# Patient Record
Sex: FEMALE
[De-identification: names, ages, dates, MRNs, and addresses within clinical notes are randomized per-mention and may not be internally consistent; named-entity substitution may affect disease eponyms.]

---

## 2018-08-11 ENCOUNTER — HOSPITAL ENCOUNTER (EMERGENCY)
Dept: HOSPITAL 68 - ERH | Age: 18
End: 2018-08-11
Payer: SELF-PAY

## 2018-08-11 VITALS — SYSTOLIC BLOOD PRESSURE: 114 MMHG | DIASTOLIC BLOOD PRESSURE: 65 MMHG

## 2018-08-11 VITALS — BODY MASS INDEX: 25.76 KG/M2 | HEIGHT: 62 IN | WEIGHT: 140 LBS

## 2018-08-11 DIAGNOSIS — M25.511: Primary | ICD-10-CM

## 2018-08-11 DIAGNOSIS — R20.0: ICD-10-CM

## 2018-08-11 NOTE — RADIOLOGY REPORT
EXAMINATION:
XR SHOULDER, RIGHT
 
CLINICAL INFORMATION:
RIGHT SHOULDER PAIN, TINGLING DOWN ARM
 
COMPARISON:
None
 
TECHNIQUE:
AP external rotation, Grashey, scapular Y, and axillary views of the right
shoulder.
 
FINDINGS:
The humeral head appears normally located within the glenoid cavity. There is
a tiny cortical step-off along the lateral aspect at the level of the
epiphysis. This is best appreciated on the Grashey view. Otherwise, no
evidence for acute fracture. There is prominence of the acromioclavicular
joint which measures 5 mm. On the external rotation view of the clavicle
appears slightly superiorly positioned compared to the acromium. The
coracoclavicular interval is maintained. The right lung apex is clear.
 
IMPRESSION:
 
1. Question possible nondisplaced fracture involving the growth plate of the
proximal humerus laterally. Recommend correlation with symptoms.
2. Question injury to the acromioclavicular joint. This could be confirmed
and further assessed with radiographs of the contralateral side and
weightbearing examination.

## 2018-08-11 NOTE — ED UPPER/LOWER EXTREMITY COMPL
History of Present Illness
 
General
Chief Complaint: Upper Extremity Problem
Stated Complaint: R ARM NUMBNESS TINLGING PAIN RADIATING TO BACK??
Source: patient
Exam Limitations: no limitations
 
Vital Signs & Intake/Output
Vital Signs & Intake/Output
 Vital Signs
 
 
Date Time Temp Pulse Resp B/P B/P Pulse O2 O2 Flow FiO2
 
     Mean Ox Delivery Rate 
 
 1615       Room Air  
 
 1352 96.5 91 20 114/65  98 Room Air  
 
 
 
Allergies
Coded Allergies:
latex (UNKNOWN 18)
Uncoded Allergies:
SEAFOOD (UNKNOWN 18)
 
Reconcile Medications
Cyclobenzaprine HCl 10 MG TABLET   1 TAB PO TID SPASMS
Methylprednisolone. (Medrol) 4 MG TAB.DS.PK   1 DP PO AD tigling right arm
     6 on day 1 then reduce by one tablet daily until gone
 
Triage Note:
PT TO ED C/O RIGHT ARM NUMBNESS THAT RADIATES TO
 BACK AND DOWN TO HAND X 2 WEEKS.
Triage Nurses Notes Reviewed? yes
Onset: Abrupt
Duration: week(s): (2), constant
Timing: recent history
Severity: moderate, severe
Pain/Injury Location:
Right: Shoulder. 
No Modifying Factors: none
Pregnant: No
Patient currently breastfeeds: No
HPI:
18-year-old female comes into the emergency room for further evaluation of right
shoulder pain.  Patient reports associated numbness and tingling that radiates 
down her right shoulder into her fingers.  She has associated pain.  Worse with 
certain movements of her shoulder.  Patient reports it started a couple weeks 
ago.  She reports she woke up and the symptoms got progressively worse today.  
She came in for further evaluation.
(Jc Matson)
 
Past History
 
Travel History
Traveled to [place] past 21 day No
 
Medical History
Any Pertinent Medical History? none
 
Surgical History
Surgical History: non-contributory
 
Psychosocial History
What is your primary language English
Tobacco Use: Never used
ETOH Use: denies use
Illicit Drug Use: denies illicit drug use
 
Family History
Hx Contributory? No
(Jc Matson)
 
Review of Systems
 
Review of Systems
Constitutional:
Reports: no symptoms. 
EENTM:
Reports: no symptoms. 
Respiratory:
Reports: no symptoms. 
Cardiovascular:
Reports: no symptoms. 
Gastrointestinal/Abdominal:
Reports: no symptoms. 
Genitourinary:
Reports: no symptoms. 
Musculoskeletal:
Reports: see HPI. 
Skin:
Reports: no symptoms. 
Neurological/Psychological:
Reports: no symptoms. 
Hematologic/Endocrine:
Reports: no symptoms. 
Immunological:
Reports: no symptoms. 
All Other Systems: Reviewed and Negative
(Jc Matson)
 
Physical Exam
 
Physical Exam
General Appearance: well developed/nourished, no apparent distress, alert, awake
Head: atraumatic
Eyes:
Bilateral: normal appearance. 
Ears, Nose, Throat: normal ENT inspection, hearing grossly normal
Neck: normal inspection
Cardiovascular/Respiratory: no respiratory distress
Back: normal inspection
Shoulder Right: normal range of motion, soft tissue tenderness, 5 out of 5  
strength, radial pulse intact, gross sensation intact,
Neurologic/Tendon: normal sensation, normal motor functions, normal tendon 
functions, responds to pain, no evidence tendon injury, no pulse deficit
Skin: intact, normal color, warm/dry
 
Diagram
Shoulders Front/Back
 
  1) 
(Jc Matson)
 
Progress
Differential Diagnosis: contusion, dislocation, fracture, septic arthritis, 
sprain, tendon injury, muscle strain
Plan of Care:
 Orders
 
 
Procedure Date/time Status
 
Durable Medical Equipment  1549 Active
 
URINE PREGNANCY  1355 Complete
 
 
 Laboratory Tests
 
 
 
18 1410:
Urine Pregnancy Test NEGATIVE
 
Diagnostic Imaging:
Viewed by Me: Radiology Read.  Discussed w/RAD: Radiology Read. 
Radiology Impression: PATIENT: APOLONIA PEDROZA  MEDICAL RECORD NO: [id number] 
PRESENT AGE: 18  PATIENT ACCOUNT NO: [id number] : 98  LOCATION: [facility] 
ORDERING PHYSICIAN: Jc LANZA     SERVICE DATE: 18- EXAM TYPE
: RAD - XRY-SHOULDER COMPLETE-RIGHT EXAMINATION: XR SHOULDER, RIGHT CLINICAL 
INFORMATION: RIGHT SHOULDER PAIN, TINGLING DOWN ARM COMPARISON: None TECHNIQUE: 
AP external rotation, Grashey, scapular Y, and axillary views of the right 
shoulder. FINDINGS: The humeral head appears normally located within the glenoid
cavity. There is a tiny cortical step-off along the lateral aspect at the level 
of the epiphysis. This is best appreciated on the Grashey view. Otherwise, no 
evidence for acute fracture. There is prominence of the acromioclavicular joint 
which measures 5 mm. On the external rotation view of the clavicle appears 
slightly superiorly positioned compared to the acromium. The coracoclavicular 
interval is maintained. The right lung apex is clear. IMPRESSION: 1. Question 
possible nondisplaced fracture involving the growth plate of the proximal 
humerus laterally. Recommend correlation with symptoms. 2. Question injury to 
the acromioclavicular joint. This could be confirmed and further assessed with 
radiographs of the contralateral side and weightbearing examination. DICTATED BY
: Charlene Edouard MD  DATE/TIME DICTATED:18 :RAD.BABIN  
DATE/TIME TRANSCRIBED:18 CONFIDENTIAL, DO NOT COPY WITHOUT 
APPROPRIATE AUTHORIZATION.  <Electronically signed in Other Vendor System>      
                                                                                
SIGNED BY: Charlene Edouard MD 18
(Jc Matson)
 
Departure
 
Departure
Disposition: HOME OR SELF CARE
Condition: Stable
Clinical Impression
Primary Impression: Right shoulder pain
Secondary Impressions: Numbness and tingling in right hand
Referrals:
Jackson LE,Herrera
 
Additional Instructions:
Follow-up with orthopedic doctor.  Take Flexeril and Medrol Dosepak as 
prescribed.  Return if any concerns worsening symptoms.
 
Please go over all results of today's visit with your primary care doctor.  
Contact your primary care doctor to let them know you were here in the emergency
room.  There may be nonspecific findings which may not be related to your visit 
today here in the emergency room but may require further evaluation and chronic 
monitoring by your primary care doctor.  If you had a laceration today the 
chance of foreign body always remains. You should follow-up with your primary 
care doctor for recheck in 3-5 days for a wound check.  If you had an x-ray done
there is a chance that a fracture could have been missed on initial read and you
should follow-up with your primary care doctor for repeat x-rays if symptoms 
persist.  If your blood pressure was elevated here in the emergency room please 
have rechecked by [facility] primary care doctor within the next 48.  If you were 
prescribed a narcotic here in the emergency room or any type of controlled 
substances you're not allowed to drive while taking this medication or operate 
any type of heavy machinery.  Narcotics can make you feel lightheaded dizziness 
nausea and can cause constipation.  You may need to  a stool softener.  
Thank you for choosing [facility] emergency room.  Please return to the 
emergency room immediately if you have any other concerns worsening of symptoms.
 
Departure Forms:
Customer Survey
General Discharge Information
Prescriptions:
Current Visit Scripts
Methylprednisolone. (Medrol) 1 DP PO AD  
     #1 DP 
     6 on day 1 then reduce by one tablet daily until gone
 
Cyclobenzaprine HCl 1 TAB PO TID  
     #30 TAB 
 
 
Comments
2018 4:39:14 PM
 
Patient clinically looks well.  Patient is in no apparent distress.  She had no 
falls or trauma.  She reports that she had broken her right shoulder when she 
was a kid.  No clinical signs for approximately humerus fracture.  Patient was 
placed in a shoulder immobilizer and referred to orthopedic doctor.  Ibuprofen 
Medrol Dosepak.  Return if any other concerns.  No motor weakness.  Cervical 
radiculopathy versus muscle spasming.
(Jc Matson)
 
PA/NP Co-Sign Statement
Statement:
ED Attending supervision documentation-
 
[] I saw and evaluated the patient. I have also reviewed all the pertinent lab 
results and diagnostic results. I agree with the findings and the plan of care 
as documented in the PA's/NP's documentation. 
 
[X] I have reviewed the ED Record and agree with the PA's/NP's documentation.
 
[] Additions or exceptions (if any) to the PAs/NP's note and plan are 
summarized below:
[]
 
(Solo LE,Ghassan BREWER)
 
Procedures
 
Splinting
Location: Right shoulder
Pre-Made Type: shoulder mobilizer
Splint Applied By: splint applied by me
Pre-Proc Neuro Vasc Exam: normal
Post-Proc Neuro Vasc Exam: normal
(Jc Matson)

## 2018-09-29 ENCOUNTER — HOSPITAL ENCOUNTER (EMERGENCY)
Dept: HOSPITAL 68 - ERH | Age: 18
LOS: 1 days | End: 2018-09-30
Payer: COMMERCIAL

## 2018-09-29 VITALS — HEIGHT: 62 IN | BODY MASS INDEX: 27.23 KG/M2 | WEIGHT: 148 LBS

## 2018-09-29 DIAGNOSIS — M54.5: Primary | ICD-10-CM

## 2018-09-30 VITALS — SYSTOLIC BLOOD PRESSURE: 130 MMHG | DIASTOLIC BLOOD PRESSURE: 60 MMHG

## 2018-09-30 NOTE — ED NECK/BACK PAIN COMPLAINT
History of Present Illness
 
General
Chief Complaint: Low Back Pain/Injury
Stated Complaint: LOW BACK PAIN
Source: patient
Exam Limitations: no limitations
 
Vital Signs & Intake/Output
Vital Signs & Intake/Output
 Vital Signs
 
 
Date Time Temp Pulse Resp B/P B/P Pulse O2 O2 Flow FiO2
 
     Mean Ox Delivery Rate 
 
09/30 0130 98.6 85 18 130/60  99 Room Air  
 
09/30 0004 99.7 89 20 104/62  96 Room Air  
 
 
 ED Intake and Output
 
 
 09/30 0000 09/29 1200
 
Intake Total  
 
Output Total  
 
Balance  
 
   
 
Patient 148 lb 
 
Weight  
 
Weight Standing Scale 
 
Measurement  
 
Method  
 
 
 
Allergies
Coded Allergies:
latex (UNKNOWN 08/11/18)
Uncoded Allergies:
SEAFOOD (UNKNOWN 08/11/18)
 
Reconcile Medications
Cyclobenzaprine HCl 10 MG TABLET   1 TAB PO TID SPASMS
Methylprednisolone. (Medrol) 4 MG TAB.DS.PK   1 DP PO AD tigling right arm
     6 on day 1 then reduce by one tablet daily until gone
 
Triage Note:
PT REPORTS FEELING WEAK AND TIRED FOR "LIKE A WEEK
 OR TWO". PT REPORTS BACK PAIN, "I TOOK SOME PILLS
 AND IT DONT GO AWAY".
Triage Nurses Notes Reviewed? yes
Pregnant: No
Patient currently breastfeeds: No
HPI:
18-year-old female presents with atraumatic middle low back pain over the past 
week after bending over.  She denies any radiation of the pain, bowel or bladder
incontinence, fever, leg weakness, flank pain, nausea, vomiting, diarrhea, rash,
history of trauma, or difficulty ambulating.  Patient reports a previous history
of scoliosis.  She denies any urinary tract symptoms.
 
Past History
 
Travel History
Traveled to [place] past 21 day No
 
Medical History
Any Pertinent Medical History? see below for history
Neurological: NONE
EENT: NONE
Cardiovascular: NONE
Respiratory: NONE
Gastrointestinal: NONE
Hepatic: NONE
Renal: NONE
Musculoskeletal: SCOLIOSIS
Psychiatric: NONE
Endocrine: NONE
Blood Disorders: NONE
Cancer(s): NONE
GYN/Reproductive: NONE
 
Surgical History
Surgical History: non-contributory
 
Psychosocial History
What is your primary language English
Tobacco Use: Never used
ETOH Use: denies use
Illicit Drug Use: marijuana
 
Family History
Hx Contributory? No
 
Review of Systems
 
Review of Systems
Constitutional:
Reports: see HPI.  Denies: no symptoms, chills, diaphoresis, fever, malaise, 
weakness, unexplained weight loss. 
 
Physical Exam
 
Physical Exam
General Appearance: well developed/nourished, no apparent distress
Head: atraumatic, normal appearance
Eyes:
Bilateral: normal appearance. 
Neck: normal inspection, supple, full range of motion
Respiratory: normal breath sounds, chest non-tender
Cardiovascular: regular rate/rhythm
Gastrointestinal: normal bowel sounds, soft, non-tender
Back: Tenderness over the middle back at the area of L4. SLR negative. EHL 
function normal. DTR's normal over patella and achilles. Limited flexion 
secondary to pain.
 
Core Measures
CVA/TIA Diagnosis: No
 
Progress
Differential Diagnosis: herniated disc, myofascial strain, sciatica, T/L spine 
injury
Plan of Care:
 Orders
 
 
Procedure Date/time Status
 
URINE PREGNANCY 09/29 2355 Complete
 
URINALYSIS 09/29 2355 Complete
 
 
 Laboratory Tests
 
 
 
09/29/18 2357:
Urine Color YEL, Urine Clarity HAZY  H, Urine pH 6.0, Ur Specific Gravity >= 
1.030, Urine Protein NEG, Urine Ketones NEG, Urine Nitrite NEG, Urine Bilirubin 
NEG, Urine Urobilinogen 0.2, Ur Leukocyte Esterase MOD  H, Ur Microscopic 
SEDIMENT EXAMINED, Urine RBC 1-3, Urine WBC 3-5  H, Ur Epithelial Cells MANY  H,
Urine Bacteria MANY  H, Urine Hemoglobin TRACE-INTACT, Urine Glucose NEG, Urine 
Pregnancy Test NEGATIVE
 
Comments:
Patient with atraumatic back pain with tenderness in the area of the annulus 
over L4-5.  Patient without signs of acute myelopathy or radicular symptoms.  
Plan is conservative treatment with nonsteroidal anti-inflammatory drugs and 
Tylenol and referral to Orth O on-call for repeated assessments.
 
Departure
 
Departure
Time of Disposition: 0145
Disposition: HOME OR SELF CARE
Condition: Stable
Clinical Impression
Primary Impression: Low back pain
Qualifiers:  Chronicity: unspecified Back pain laterality: midline Sciatica 
presence: without sciatica Qualified Code: M54.5 - Low back pain
Referrals:
Patient Has No Primary Care Dr (PCP/Family)
 
Additional Instructions:
Please take extra strength Tylenol 1000 mg every 4 hours as needed for pain.  
Please follow-up with the orthopedist on call for repeat assessment this week
Departure Forms:
Customer Survey
General Discharge Information